# Patient Record
Sex: FEMALE | Race: OTHER | HISPANIC OR LATINO | URBAN - METROPOLITAN AREA
[De-identification: names, ages, dates, MRNs, and addresses within clinical notes are randomized per-mention and may not be internally consistent; named-entity substitution may affect disease eponyms.]

---

## 2018-01-22 ENCOUNTER — EMERGENCY (EMERGENCY)
Facility: HOSPITAL | Age: 28
LOS: 1 days | Discharge: HOME | End: 2018-01-22
Admitting: EMERGENCY MEDICINE
Payer: COMMERCIAL

## 2018-01-22 VITALS
RESPIRATION RATE: 16 BRPM | OXYGEN SATURATION: 99 % | HEART RATE: 76 BPM | WEIGHT: 130.07 LBS | SYSTOLIC BLOOD PRESSURE: 120 MMHG | TEMPERATURE: 98 F | DIASTOLIC BLOOD PRESSURE: 73 MMHG

## 2018-01-22 DIAGNOSIS — M79.671 PAIN IN RIGHT FOOT: ICD-10-CM

## 2018-01-22 DIAGNOSIS — S93.601A UNSPECIFIED SPRAIN OF RIGHT FOOT, INITIAL ENCOUNTER: ICD-10-CM

## 2018-01-22 DIAGNOSIS — R51 HEADACHE: ICD-10-CM
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
## 2018-01-22 PROCEDURE — 73630 X-RAY EXAM OF FOOT: CPT

## 2018-01-22 PROCEDURE — 73630 X-RAY EXAM OF FOOT: CPT | Mod: 26,RT

## 2018-01-22 PROCEDURE — 99283 EMERGENCY DEPT VISIT LOW MDM: CPT

## 2018-01-22 PROCEDURE — 99283 EMERGENCY DEPT VISIT LOW MDM: CPT | Mod: 25

## 2018-01-22 RX ORDER — IBUPROFEN 200 MG
1 TABLET ORAL
Qty: 30 | Refills: 0 | OUTPATIENT
Start: 2018-01-22 | End: 2018-01-31

## 2018-01-22 NOTE — ED PROVIDER NOTE - MUSCULOSKELETAL, MLM
+ tenderness to dorsum right foot. chronic deformities to 2nd through 5th toes. limited ROM due to pain. no swelling, bruising or redness. distal NVI. remainder of foot and ankle non tender.

## 2018-01-22 NOTE — ED PROVIDER NOTE - MEDICAL DECISION MAKING DETAILS
foot pain likely sprain. pt well appearing. no evidence of infection. x-ray no acute pathology. motrin, rice and fu with podiatry. intermittent ha's. neuro exam intact. no ha at this time. will refer to neuro
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
## 2019-10-07 ENCOUNTER — EMERGENCY (EMERGENCY)
Facility: HOSPITAL | Age: 29
LOS: 1 days | Discharge: HOME | End: 2019-10-07
Admitting: EMERGENCY MEDICINE
Payer: COMMERCIAL

## 2019-10-07 VITALS
SYSTOLIC BLOOD PRESSURE: 108 MMHG | OXYGEN SATURATION: 97 % | WEIGHT: 130.07 LBS | TEMPERATURE: 99 F | DIASTOLIC BLOOD PRESSURE: 74 MMHG | HEART RATE: 64 BPM | RESPIRATION RATE: 15 BRPM

## 2019-10-07 VITALS
SYSTOLIC BLOOD PRESSURE: 136 MMHG | HEART RATE: 65 BPM | OXYGEN SATURATION: 98 % | TEMPERATURE: 98 F | RESPIRATION RATE: 16 BRPM | DIASTOLIC BLOOD PRESSURE: 62 MMHG

## 2019-10-07 DIAGNOSIS — N64.4 MASTODYNIA: ICD-10-CM

## 2019-10-07 DIAGNOSIS — Z79.1 LONG TERM (CURRENT) USE OF NON-STEROIDAL ANTI-INFLAMMATORIES (NSAID): ICD-10-CM

## 2019-10-07 DIAGNOSIS — N61.1 ABSCESS OF THE BREAST AND NIPPLE: ICD-10-CM

## 2019-10-07 LAB
ALBUMIN SERPL ELPH-MCNC: 3.9 G/DL — SIGNIFICANT CHANGE UP (ref 3.3–5)
ALP SERPL-CCNC: 62 U/L — SIGNIFICANT CHANGE UP (ref 40–120)
ALT FLD-CCNC: 24 U/L — SIGNIFICANT CHANGE UP (ref 10–45)
ANION GAP SERPL CALC-SCNC: 12 MMOL/L — SIGNIFICANT CHANGE UP (ref 5–17)
AST SERPL-CCNC: 14 U/L — SIGNIFICANT CHANGE UP (ref 10–40)
BASOPHILS # BLD AUTO: 0.05 K/UL — SIGNIFICANT CHANGE UP (ref 0–0.2)
BASOPHILS NFR BLD AUTO: 0.3 % — SIGNIFICANT CHANGE UP (ref 0–2)
BILIRUB SERPL-MCNC: 0.3 MG/DL — SIGNIFICANT CHANGE UP (ref 0.2–1.2)
BUN SERPL-MCNC: 10 MG/DL — SIGNIFICANT CHANGE UP (ref 7–23)
CALCIUM SERPL-MCNC: 9.1 MG/DL — SIGNIFICANT CHANGE UP (ref 8.4–10.5)
CHLORIDE SERPL-SCNC: 102 MMOL/L — SIGNIFICANT CHANGE UP (ref 96–108)
CO2 SERPL-SCNC: 24 MMOL/L — SIGNIFICANT CHANGE UP (ref 22–31)
CREAT SERPL-MCNC: 0.67 MG/DL — SIGNIFICANT CHANGE UP (ref 0.5–1.3)
EOSINOPHIL # BLD AUTO: 0.02 K/UL — SIGNIFICANT CHANGE UP (ref 0–0.5)
EOSINOPHIL NFR BLD AUTO: 0.1 % — SIGNIFICANT CHANGE UP (ref 0–6)
GLUCOSE SERPL-MCNC: 126 MG/DL — HIGH (ref 70–99)
GRAM STN FLD: SIGNIFICANT CHANGE UP
HCT VFR BLD CALC: 42.9 % — SIGNIFICANT CHANGE UP (ref 34.5–45)
HGB BLD-MCNC: 13.7 G/DL — SIGNIFICANT CHANGE UP (ref 11.5–15.5)
IMM GRANULOCYTES NFR BLD AUTO: 0.5 % — SIGNIFICANT CHANGE UP (ref 0–1.5)
LYMPHOCYTES # BLD AUTO: 1.96 K/UL — SIGNIFICANT CHANGE UP (ref 1–3.3)
LYMPHOCYTES # BLD AUTO: 11.9 % — LOW (ref 13–44)
MCHC RBC-ENTMCNC: 28.1 PG — SIGNIFICANT CHANGE UP (ref 27–34)
MCHC RBC-ENTMCNC: 31.9 GM/DL — LOW (ref 32–36)
MCV RBC AUTO: 87.9 FL — SIGNIFICANT CHANGE UP (ref 80–100)
MONOCYTES # BLD AUTO: 1.18 K/UL — HIGH (ref 0–0.9)
MONOCYTES NFR BLD AUTO: 7.2 % — SIGNIFICANT CHANGE UP (ref 2–14)
NEUTROPHILS # BLD AUTO: 13.21 K/UL — HIGH (ref 1.8–7.4)
NEUTROPHILS NFR BLD AUTO: 80 % — HIGH (ref 43–77)
NRBC # BLD: 0 /100 WBCS — SIGNIFICANT CHANGE UP (ref 0–0)
PLATELET # BLD AUTO: 236 K/UL — SIGNIFICANT CHANGE UP (ref 150–400)
POTASSIUM SERPL-MCNC: 3.5 MMOL/L — SIGNIFICANT CHANGE UP (ref 3.5–5.3)
POTASSIUM SERPL-SCNC: 3.5 MMOL/L — SIGNIFICANT CHANGE UP (ref 3.5–5.3)
PROT SERPL-MCNC: 6.8 G/DL — SIGNIFICANT CHANGE UP (ref 6–8.3)
RBC # BLD: 4.88 M/UL — SIGNIFICANT CHANGE UP (ref 3.8–5.2)
RBC # FLD: 13.4 % — SIGNIFICANT CHANGE UP (ref 10.3–14.5)
SODIUM SERPL-SCNC: 138 MMOL/L — SIGNIFICANT CHANGE UP (ref 135–145)
SPECIMEN SOURCE: SIGNIFICANT CHANGE UP
WBC # BLD: 16.5 K/UL — HIGH (ref 3.8–10.5)
WBC # FLD AUTO: 16.5 K/UL — HIGH (ref 3.8–10.5)

## 2019-10-07 PROCEDURE — 99284 EMERGENCY DEPT VISIT MOD MDM: CPT | Mod: 25

## 2019-10-07 PROCEDURE — 36415 COLL VENOUS BLD VENIPUNCTURE: CPT

## 2019-10-07 PROCEDURE — 80053 COMPREHEN METABOLIC PANEL: CPT

## 2019-10-07 PROCEDURE — 99285 EMERGENCY DEPT VISIT HI MDM: CPT

## 2019-10-07 PROCEDURE — 76641 ULTRASOUND BREAST COMPLETE: CPT

## 2019-10-07 PROCEDURE — 87186 SC STD MICRODIL/AGAR DIL: CPT

## 2019-10-07 PROCEDURE — 76641 ULTRASOUND BREAST COMPLETE: CPT | Mod: 26,RT

## 2019-10-07 PROCEDURE — 10060 I&D ABSCESS SIMPLE/SINGLE: CPT | Mod: RT

## 2019-10-07 PROCEDURE — 85025 COMPLETE CBC W/AUTO DIFF WBC: CPT

## 2019-10-07 PROCEDURE — 96375 TX/PRO/DX INJ NEW DRUG ADDON: CPT | Mod: XU

## 2019-10-07 PROCEDURE — 87205 SMEAR GRAM STAIN: CPT

## 2019-10-07 PROCEDURE — 96374 THER/PROPH/DIAG INJ IV PUSH: CPT | Mod: XU

## 2019-10-07 PROCEDURE — 87040 BLOOD CULTURE FOR BACTERIA: CPT

## 2019-10-07 PROCEDURE — 87070 CULTURE OTHR SPECIMN AEROBIC: CPT

## 2019-10-07 RX ORDER — IBUPROFEN 200 MG
1 TABLET ORAL
Qty: 15 | Refills: 0
Start: 2019-10-07 | End: 2019-10-11

## 2019-10-07 RX ORDER — HYDROMORPHONE HYDROCHLORIDE 2 MG/ML
0.5 INJECTION INTRAMUSCULAR; INTRAVENOUS; SUBCUTANEOUS ONCE
Refills: 0 | Status: DISCONTINUED | OUTPATIENT
Start: 2019-10-07 | End: 2019-10-07

## 2019-10-07 RX ORDER — KETOROLAC TROMETHAMINE 30 MG/ML
15 SYRINGE (ML) INJECTION ONCE
Refills: 0 | Status: DISCONTINUED | OUTPATIENT
Start: 2019-10-07 | End: 2019-10-07

## 2019-10-07 RX ADMIN — Medication 100 MILLIGRAM(S): at 16:06

## 2019-10-07 RX ADMIN — HYDROMORPHONE HYDROCHLORIDE 0.5 MILLIGRAM(S): 2 INJECTION INTRAMUSCULAR; INTRAVENOUS; SUBCUTANEOUS at 22:13

## 2019-10-07 RX ADMIN — Medication 15 MILLIGRAM(S): at 16:06

## 2019-10-07 NOTE — ED PROVIDER NOTE - PATIENT PORTAL LINK FT
You can access the FollowMyHealth Patient Portal offered by [facility] by registering at the following website: [email or web address]. By joining [employer]’s FollowMyHealth portal, you will also be able to view your health information using other applications (apps) compatible with our system.
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
## 2019-10-07 NOTE — ED PROVIDER NOTE - OBJECTIVE STATEMENT
30 y/o F with no PMHx presents to the ED with complaints of right sided atraumatic breast pain x 1.5 weeks, increasing in severity over the past 3 days. Pt reports associated swelling, redness, and tenderness. Pt notes that she has been using warm compresses without relief. Denies fever, chills, or drainage.

## 2019-10-07 NOTE — ED PROVIDER NOTE - PHYSICAL EXAMINATION
VITAL SIGNS: I have reviewed nursing notes and confirm.  CONSTITUTIONAL: Well-developed; well-nourished; in no acute distress.  SKIN: Agree with RN documentation regarding decubitus evaluation. Remainder of skin exam is warm and dry, no acute rash. Moderate erythema and swelling with possible fluctuance noted to 2x2cm node on right breast, left of areola. Non-demarcated blanching erythema.   HEAD: Normocephalic; atraumatic.  EYES: PERRL, EOM intact; conjunctiva and sclera clear.  ENT: No nasal discharge; airway clear.  NECK: Supple; non tender.  CARD: S1, S2 normal; no murmurs, gallops, or rubs. Regular rate and rhythm.  RESP: No wheezes, rales or rhonchi.  ABD: Normal bowel sounds; soft; non-distended; non-tender; no hepatosplenomegaly.  EXT: Normal ROM. No clubbing, cyanosis or edema.  LYMPH: No acute cervical adenopathy.  NEURO: Alert, oriented. Grossly unremarkable.  PSYCH: Cooperative, appropriate.

## 2019-10-07 NOTE — CONSULT NOTE ADULT - SUBJECTIVE AND OBJECTIVE BOX
HPI: 29F PMH L breast abscesses 2017, no other PMH/PSH, presents with 3 days of R breast pain. The pain is periareolar, sharp, and present with pressure. She states that the pain has been constant for 1 day. She also endorses 2 days of erythema/edema. She denies any drainage, bleeding, fevers/chills, nausea/vomiting, chest pain, shortness of breath, or history of breast cancer. Her L breast abscesses in 2017 were managed with topical antibiotics, and without systemic antibiotics or drainage.        PAST MEDICAL & SURGICAL HISTORY:  No pertinent past medical history  No significant past surgical history      FAMILY HISTORY:  No family history of breast cancer    MEDICATIONS:  None    Allergies  No Known Allergies          OBJECTIVE:  Vital Signs Last 24 Hrs  T(C): 36.8 (07 Oct 2019 22:40), Max: 37.2 (07 Oct 2019 14:39)  T(F): 98.2 (07 Oct 2019 22:40), Max: 98.9 (07 Oct 2019 14:39)  HR: 65 (07 Oct 2019 22:40) (64 - 69)  BP: 136/62 (07 Oct 2019 22:40) (108/74 - 136/62)  BP(mean): --  RR: 16 (07 Oct 2019 22:40) (15 - 16)  SpO2: 98% (07 Oct 2019 22:40) (97% - 99%)    I&O's Summary      Physical Exam:  General: NAD, resting comfortably  Respiratory: No accessory muscle use  Left breast: No masses appreciated, 1cm scar at 9 o'clock and 12 o'clock region  Right breast: Erythema and induration from 4cm superior to nipple to 1cm inferior to nipple from 9 o'clock to 2 o'clock region. Fluctuance from 12 o'clock region to 2 o'clock region at areolar border.  Neuro: AAO x 3, no focal deficits      LABS:                        13.7   16.50 )-----------( 236      ( 07 Oct 2019 15:54 )             42.9     10-07    138  |  102  |  10  ----------------------------<  126<H>  3.5   |  24  |  0.67    Ca    9.1      07 Oct 2019 15:54    TPro  6.8  /  Alb  3.9  /  TBili  0.3  /  DBili  x   /  AST  14  /  ALT  24  /  AlkPhos  62  10-07        CAPILLARY BLOOD GLUCOSE        LIVER FUNCTIONS - ( 07 Oct 2019 15:54 )  Alb: 3.9 g/dL / Pro: 6.8 g/dL / ALK PHOS: 62 U/L / ALT: 24 U/L / AST: 14 U/L / GGT: x           ULTRASOUND  Findings: Targeted ultrasound of the right breast demonstrates a   multiloculated complex cystic mass in the retroareolar region with   peripheral and internal vascularity measuring up to 3.1 x 1.6 x 3.8 cm.   No tract is identified to the skin surface.. There are dilated ducts and   subcutaneous tissue edema.    IMPRESSION: 3.1 x 1.6 x 3.8 cm retroareolar multiloculated complex cystic   mass with peripheral and internal vascularity could represent a   developing abscess.

## 2019-10-07 NOTE — ED PROVIDER NOTE - CARE PROVIDER_API CALL
Alphonse Marrero (MD)  Surgery  [address], [facility], [place] [address]  Phone: [phone]  Fax: [phone]  Follow Up Time:

## 2019-10-07 NOTE — ED ADULT NURSE NOTE - OBJECTIVE STATEMENT
Patient presents to the ED complaining of right sided breast pain since mid september, however the pain has gotten worst the past three day. Redness and swelling noted to red breast. No discharge. Denies any fever or chills.

## 2019-10-07 NOTE — ED PROVIDER NOTE - CLINICAL SUMMARY MEDICAL DECISION MAKING FREE TEXT BOX
30 y/o female with R breast discomfort. US + for abscess with elevated wbc. pt afebrile and non-toxic appearing. Surgery conducted aspiration. Will fu with ACS in one week.

## 2019-10-07 NOTE — ED PROVIDER NOTE - NSFOLLOWUPINSTRUCTIONS_ED_ALL_ED_FT
Abscess Incision and Drainage    WHAT YOU NEED TO KNOW:    What do I need to know about an abscess incision and drainage? An abscess incision and drainage (I and D) is a procedure to drain pus from an abscess and clean it out so it can heal.    How do I prepare for an I and D? Your healthcare provider will talk to you about how to prepare for an I and D. He will tell you what medicines to take or not take on the day of your I and D.    What will happen during an I and D? You will be given local or regional anesthesia to numb the area and keep you free from pain. Your healthcare provider will make an incision in your skin near or over the abscess. He will press on the area to drain the pus. A cotton swab or other medical tool wrapped in gauze may be used to clean the inside of the abscess. Your healthcare provider may wash the wound with saline (salt water). He may place plain gauze or gauze with medicine in your wound to help it heal. A dry bandage will be placed over your wound. You may be given antibiotics to treat the infection.    What are the risks of an I and D? A scar may form on your skin as it heals. Your incision may heal slowly, feel painful, or get infected. Your abscess may come back, even after treatment. You may need another I and D if the abscess comes back. The bacteria may spread to your heart or other organs. This can be life-threatening.    CARE AGREEMENT:    You have the right to help plan your care. Learn about your health condition and how it may be treated. Discuss treatment options with your healthcare providers to decide what care you want to receive. You always have the right to refuse treatment.        © Copyright [employer] 2019 All illustrations and images included in CareNotes are the copyrighted property of [employer] or [employer].      back to top                      © Copyright [employer] 2019

## 2019-10-10 LAB
-  CEFAZOLIN: SIGNIFICANT CHANGE UP
-  CLINDAMYCIN: SIGNIFICANT CHANGE UP
-  ERYTHROMYCIN: SIGNIFICANT CHANGE UP
-  LINEZOLID: SIGNIFICANT CHANGE UP
-  OXACILLIN: SIGNIFICANT CHANGE UP
-  RIFAMPIN: SIGNIFICANT CHANGE UP
-  TRIMETHOPRIM/SULFAMETHOXAZOLE: SIGNIFICANT CHANGE UP
-  VANCOMYCIN: SIGNIFICANT CHANGE UP
CULTURE RESULTS: SIGNIFICANT CHANGE UP
METHOD TYPE: SIGNIFICANT CHANGE UP
ORGANISM # SPEC MICROSCOPIC CNT: SIGNIFICANT CHANGE UP
ORGANISM # SPEC MICROSCOPIC CNT: SIGNIFICANT CHANGE UP
SPECIMEN SOURCE: SIGNIFICANT CHANGE UP

## 2019-10-11 RX ORDER — AZTREONAM 2 G
1 VIAL (EA) INJECTION
Qty: 20 | Refills: 0
Start: 2019-10-11 | End: 2019-10-20

## 2019-10-12 LAB
CULTURE RESULTS: SIGNIFICANT CHANGE UP
CULTURE RESULTS: SIGNIFICANT CHANGE UP
SPECIMEN SOURCE: SIGNIFICANT CHANGE UP
SPECIMEN SOURCE: SIGNIFICANT CHANGE UP

## 2019-10-14 ENCOUNTER — APPOINTMENT (OUTPATIENT)
Dept: BREAST CENTER | Facility: CLINIC | Age: 29
End: 2019-10-14

## 2019-10-14 PROBLEM — Z00.00 ENCOUNTER FOR PREVENTIVE HEALTH EXAMINATION: Status: ACTIVE | Noted: 2019-10-14

## 2020-08-31 ENCOUNTER — EMERGENCY (EMERGENCY)
Facility: HOSPITAL | Age: 30
LOS: 1 days | Discharge: HOME | End: 2020-08-31
Attending: EMERGENCY MEDICINE | Admitting: EMERGENCY MEDICINE
Payer: SELF-PAY

## 2020-08-31 VITALS
OXYGEN SATURATION: 100 % | HEART RATE: 73 BPM | HEIGHT: 63 IN | SYSTOLIC BLOOD PRESSURE: 114 MMHG | RESPIRATION RATE: 18 BRPM | TEMPERATURE: 99 F | WEIGHT: 130.07 LBS | DIASTOLIC BLOOD PRESSURE: 69 MMHG

## 2020-08-31 PROCEDURE — 99284 EMERGENCY DEPT VISIT MOD MDM: CPT

## 2020-08-31 PROCEDURE — 99283 EMERGENCY DEPT VISIT LOW MDM: CPT

## 2020-08-31 RX ORDER — IBUPROFEN 200 MG
600 TABLET ORAL ONCE
Refills: 0 | Status: COMPLETED | OUTPATIENT
Start: 2020-08-31 | End: 2020-08-31

## 2020-08-31 RX ADMIN — Medication 600 MILLIGRAM(S): at 16:06
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
## 2020-08-31 NOTE — ED PROVIDER NOTE - PATIENT PORTAL LINK FT
You can access the FollowMyHealth Patient Portal offered by [facility] by registering at the following website: [email or web address]. By joining [employer]’s FollowMyHealth portal, you will also be able to view your health information using other applications (apps) compatible with our system.

## 2020-08-31 NOTE — ED PROVIDER NOTE - NSFOLLOWUPCLINICS_GEN_ALL_ED_FT
[facility]  Family Medicine  [address] [place] [address]  Phone: [phone]  Fax:   Follow Up Time: 4-6 Days    [facility]  Urology  [address] [place] [address]  Phone: [phone]  Fax:   Follow Up Time: 4-6 Days

## 2020-08-31 NOTE — ED PROVIDER NOTE - NSFOLLOWUPINSTRUCTIONS_ED_ALL_ED_FT
Please follow up with your primary care doctor as needed. Return to the ED if you develop any concerning symptoms. Take over-the-counter Tylenol or Ibuprofen as prescribed for pain.     Musculoskeletal Pain  Musculoskeletal pain refers to aches and pains in your bones, joints, muscles, and the tissues that surround them. This pain can occur in any part of the body. It can last for a short time (acute) or a long time (chronic).  A physical exam, lab tests, and imaging studies may be done to find the cause of your musculoskeletal pain.  Follow these instructions at home:     Lifestyle     Try to control or lower your stress levels. Stress increases muscle tension and can worsen musculoskeletal pain. It is important to recognize when you are anxious or stressed and learn ways to manage it. This may include:  Meditation or yoga.Cognitive or behavioral therapy.Acupuncture or massage therapy.You may continue all activities unless the activities cause more pain. When the pain gets better, slowly resume your normal activities. Gradually increase the intensity and duration of your activities or exercise.Managing pain, stiffness, and swelling     Take over-the-counter and prescription medicines only as told by your health care provider.When your pain is severe, bed rest may be helpful. Lie or sit in any position that is comfortable, but get out of bed and walk around at least every couple of hours.If directed, apply heat to the affected area as often as told by your health care provider. Use the heat source that your health care provider recommends, such as a moist heat pack or a heating pad.  Place a towel between your skin and the heat source.Leave the heat on for 20–30 minutes.Remove the heat if your skin turns bright red. This is especially important if you are unable to feel pain, heat, or cold. You may have a greater risk of getting burned.If directed, put ice on the painful area.  Put ice in a plastic bag.Place a towel between your skin and the bag.Leave the ice on for 20 minutes, 2–3 times a day.General instructions     Your health care provider may recommend that you see a physical therapist. This person can help you come up with a safe exercise program. Do any exercises as told by your physical therapist.Keep all follow-up visits, including any physical therapy visits, as told by your health care providers. This is important.Contact a health care provider if:  Your pain gets worse.Medicines do not help ease your pain.You cannot use the part of your body that hurts, such as your arm, leg, or neck.You have trouble sleeping.You have trouble doing your normal activities.Get help right away if:  You have a new injury and your pain is worse or different.You feel numb or you have tingling in the painful area.Summary  Musculoskeletal pain refers to aches and pains in your bones, joints, muscles, and the tissues that surround them.This pain can occur in any part of the body.Your health care provider may recommend that you see a physical therapist. This person can help you come up with a safe exercise program. Do any exercises as told by your physical therapist.Lower your stress level. Stress can worsen musculoskeletal pain. Ways to lower stress may include meditation, yoga, cognitive or behavioral therapy, acupuncture, and massage therapy.This information is not intended to replace advice given to you by your health care provider. Make sure you discuss any questions you have with your health care provider.    Document Released: 12/18/2006 Document Revised: 11/30/2018 Document Reviewed: 01/17/2018  Elsevier Patient Education © 2020 [employer] Inc[employer]
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
## 2020-08-31 NOTE — ED PROVIDER NOTE - PHYSICAL EXAMINATION
VITAL SIGNS: I have reviewed nursing notes and confirm.  CONSTITUTIONAL: Well-developed; well-nourished; in no acute distress.   SKIN:  warm and dry, no acute rash.   HEAD:  normocephalic, atraumatic.  EYES: EOM intact; conjunctiva and sclera clear.  ENT: No nasal discharge; airway clear.   NECK: No midline spinal tenderness. Mild tenderness to both trapezius muscles, b/l paraspinal muscles in lumbar area.  CARD: S1, S2 normal; no murmurs, gallops, or rubs. Regular rate and rhythm.   RESP:  Clear to auscultation b/l, no wheezes, rales or rhonchi.  ABD: Normal bowel sounds; soft; non-distended; non-tender; no guarding/ rebound.  EXT: Normal ROM. No clubbing, cyanosis or edema. 2+ pulses to b/l ue/le.  NEURO: AAO x 3, CN II-XII intact, normal speech, strength 5/5 bilateral upper and lower extremities, sensation intact, cerebellum intact, no ataxia, follows commands appropriately  PSYCH: Cooperative, mood and affect appropriate. VITAL SIGNS: I have reviewed nursing notes and confirm.  CONSTITUTIONAL: Well-developed; well-nourished; in no acute distress.   SKIN:  warm and dry, no acute rash.   HEAD:  normocephalic, atraumatic.  EYES: EOM intact; conjunctiva and sclera clear.  ENT: No nasal discharge; airway clear.   NECK: No midline spinal tenderness. Mild tenderness to both trapezius muscles, b/l paraspinal muscles in lumbar area.  CARD: S1, S2 normal; no murmurs, gallops, or rubs. Regular rate and rhythm.   RESP:  Clear to auscultation b/l, no wheezes, rales or rhonchi.  ABD: Normal bowel sounds; soft; non-distended; non-tender; no guarding/ rebound.  EXT: Normal ROM.    NEURO: AAO x 3, CN II-XII intact, normal speech, strength 5/5 bilateral upper and lower extremities, sensation intact, cerebellum intact, no ataxia, follows commands appropriately  PSYCH: Cooperative, mood and affect appropriate.

## 2020-09-04 DIAGNOSIS — M54.5 LOW BACK PAIN: ICD-10-CM

## 2020-09-04 DIAGNOSIS — M54.9 DORSALGIA, UNSPECIFIED: ICD-10-CM
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
## 2020-09-04 DIAGNOSIS — M54.2 CERVICALGIA: ICD-10-CM
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
## 2021-06-29 NOTE — ED ADULT NURSE NOTE - NSFALLRSKOUTCOME_ED_ALL_ED
[facility]  Progress Note Ramy Arriaga 5/24/1926, 81 y o  male MRN: [id number]  Unit/Bed#: S -01 Encounter: [id number]  Primary Care Provider: Phyllis Macedo MD   Date and time admitted to hospital: 6/28/2021 11:39 AM    Abrasions of multiple sites  Assessment & Plan  Stable and healing well  - no trauma follow up needed  - pain medication PRN      VINICIUS (acute kidney injury) ([facility] [address] )  Assessment & Plan  Improving  - Cr 1 59 today but still above baseline cr  - contininue IVF and hold BP meds as above  - SLIM following    Thrombocytopenia (HCC)  Assessment & Plan  Chronic condition in setting of multiple myeloma  - slight decrease today to 39  - SLIM following    Acquired hypothyroidism  Assessment & Plan  Stable  - continue home medication    Essential hypertension  Assessment & Plan  Stable  - HCTZ and triamterene on hold secondary to VINICIUS  - SLIM following      No additional injuries found on tertiary exam requiring trauma care  Please call if needed  No outpt follow up needed with trauma    TERTIARY TRAUMA SURVEY NOTE    Prophylaxis: Sequential compression device (Venodyne)     Disposition: per primary team    Code status:  Level 1 - Full Code    Consultants: SL trauma     Is the patient 72 years or older?: YES:    1  Before the illness or injury that brought you to the Emergency, did you need someone to help you on a regular basis? 1=Yes   2  Since the illness or injury that brought you to the Emergency, have you needed more help than usual to take care of yourself? 1=Yes   3  Have you been hospitalized for one or more nights during the past 6 months (excluding a stay in the Emergency Department)? 0=No   4  In general, do you see well? 0=Yes   5  In general, do you have serious problems with your memory? 0=No   6  Do you take more than three different medications everyday?  1=Yes   TOTAL   3     Did you order a geriatric consult if the score was 2 or greater?: no, but will recommend to primary team          SUBJECTIVE:     Transfer from: N/A  Outside Films Received: not applicable  Tertiary Exam Due on: 6/29/21    Mechanism of Injury:Fall    Details related to Injury: +LOC:  no    Chief Complaint: none    HPI/Last 24 hour events: no events overnight  Voiding and tolerating diet    Active medications:           Current Facility-Administered Medications:     acetaminophen (TYLENOL) tablet 650 mg, 650 mg, Oral, Q6H PRN    allopurinol (ZYLOPRIM) tablet 200 mg, 200 mg, Oral, Daily    bacitracin topical ointment 1 small application, 1 small application, Topical, BID, 1 small application at 83/35/26 1840    docusate sodium (COLACE) capsule 100 mg, 100 mg, Oral, BID, 100 mg at 06/28/21 2014    levothyroxine tablet 100 mcg, 100 mcg, Oral, Early Morning, 100 mcg at 06/29/21 0511    ondansetron (ZOFRAN-ODT) dispersible tablet 4 mg, 4 mg, Oral, Q6H PRN    pantoprazole (PROTONIX) EC tablet 20 mg, 20 mg, Oral, Daily    polyethylene glycol (MIRALAX) packet 17 g, 17 g, Oral, Daily PRN    senna (SENOKOT) tablet 8 6 mg, 1 tablet, Oral, HS    sodium chloride 0 9 % infusion, 100 mL/hr, Intravenous, Continuous, 100 mL/hr at 06/28/21 2012      OBJECTIVE:     Vitals:   Vitals:    06/29/21 0715   BP: 111/61   Pulse: 88   Resp: 12   Temp: 97 7 °F (36 5 °C)   SpO2: 96%       Physical Exam:   GENERAL APPEARANCE: NAD  NEURO: GCS 15  HEENT: normocephalic, R preauricular and corner of right mouth contusion  CV: RRR  LUNGS: CTAB  GI: soft and nontender  : Voiding well  MSK: WHITFIELD and NVI x4  SKIN: warm and dry    I/O:   I/O       06/27 0701 - 06/28 0700 06/28 0701 - 06/29 0700 06/29 0701 - 06/30 0700    IV Piggyback  1000     Total Intake  1000     Net  +1000                  Invasive Devices:    Invasive Devices     Peripheral Intravenous Line            Peripheral IV 06/28/21 Left Antecubital <1 day                  Imaging:   XR chest 2 views    Result Date: 6/28/2021  Impression: No acute cardiopulmonary disease  Findings are stable Workstation performed: [id number]     CT head without contrast    Result Date: 6/28/2021  Impression: No acute intracranial abnormality   Workstation performed: [id number]       Labs:   CBC:   Lab Results   Component Value Date    WBC 6 46 06/29/2021    HGB 10 3 (L) 06/29/2021    HCT 30 6 (L) 06/29/2021     (H) 06/29/2021    PLT 36 (LL) 06/29/2021    MCH 36 0 (H) 06/29/2021    MCHC 33 7 06/29/2021    RDW 15 8 (H) 06/29/2021    MPV 14 4 (H) 06/29/2021    NRBC 0 06/28/2021     CMP:   Lab Results   Component Value Date     06/29/2021    CO2 21 06/29/2021    BUN 76 (H) 06/29/2021    CREATININE 1 59 (H) 06/29/2021    CALCIUM 7 7 (L) 06/29/2021    AST 34 06/29/2021    ALT 24 06/29/2021    ALKPHOS 130 (H) 06/29/2021    EGFR 37 06/29/2021 Universal Safety Interventions

## 2021-12-29 ENCOUNTER — EMERGENCY (EMERGENCY)
Facility: HOSPITAL | Age: 31
LOS: 1 days | Discharge: HOME | End: 2021-12-29
Admitting: EMERGENCY MEDICINE
Payer: COMMERCIAL

## 2021-12-29 VITALS
WEIGHT: 143.08 LBS | TEMPERATURE: 98 F | DIASTOLIC BLOOD PRESSURE: 75 MMHG | SYSTOLIC BLOOD PRESSURE: 124 MMHG | HEIGHT: 63 IN | RESPIRATION RATE: 16 BRPM | HEART RATE: 96 BPM | OXYGEN SATURATION: 96 %

## 2021-12-29 DIAGNOSIS — J45.909 UNSPECIFIED ASTHMA, UNCOMPLICATED: ICD-10-CM

## 2021-12-29 DIAGNOSIS — R05.1 ACUTE COUGH: ICD-10-CM

## 2021-12-29 DIAGNOSIS — U07.1 COVID-19: ICD-10-CM

## 2021-12-29 LAB
HIV 1+2 AB+HIV1 P24 AG SERPL QL IA: SIGNIFICANT CHANGE UP
RAPID RVP RESULT: DETECTED
SARS-COV-2 RNA SPEC QL NAA+PROBE: DETECTED

## 2021-12-29 PROCEDURE — 0225U NFCT DS DNA&RNA 21 SARSCOV2: CPT

## 2021-12-29 PROCEDURE — 87389 HIV-1 AG W/HIV-1&-2 AB AG IA: CPT

## 2021-12-29 PROCEDURE — 99283 EMERGENCY DEPT VISIT LOW MDM: CPT

## 2021-12-29 PROCEDURE — 99284 EMERGENCY DEPT VISIT MOD MDM: CPT

## 2021-12-29 PROCEDURE — 36415 COLL VENOUS BLD VENIPUNCTURE: CPT

## 2021-12-29 NOTE — ED PROVIDER NOTE - CLINICAL SUMMARY MEDICAL DECISION MAKING FREE TEXT BOX
32 y/o F with a cc of body aches and fatigue, suspect viral infection, wants to check for Covid, otherwise healthy, recommend f/u with pmd, seek medical attn if worsen symptoms immediately, take Tylenol / Motrin for fever and aches if needed, any worsening symptoms come back to ED.

## 2021-12-29 NOTE — ED PROVIDER NOTE - PATIENT PORTAL LINK FT
You can access the FollowMyHealth Patient Portal offered by [facility] by registering at the following website: [email or web address]. By joining [employer]’s FollowMyHealth portal, you will also be able to view your health information using other applications (apps) compatible with our system.

## 2021-12-29 NOTE — ED PROVIDER NOTE - ENMT, MLM
Airway patent, Nasal mucosa clear. Mouth with normal mucosa. Throat has no vesicles, no oropharyngeal exudates and uvula is midline. Mild erythema to pharynx, no tonsil enlargements.

## 2021-12-29 NOTE — ED PROVIDER NOTE - OBJECTIVE STATEMENT
30 y/o F with a PMHX of asthma presents to the ED with a CC of dry cough, fatigue, sweats, chills and myalgia for x2 days. Reports x1 bilious non bloody vomiting. Denies any diarrhea, abdominal pain, ha, congestion, sore throat, vision changes and any gate imbalances. 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 Pt is fully vaccinated for Covid and is requesting HIV testing.

## 2022-01-14 NOTE — ED ADULT NURSE NOTE - CAS EDN DISCHARGE ASSESSMENT
Initial Anesthesia Post-op Note    Patient: Kinjal Abdalla  Procedure(s) Performed: ESOPHAGOGASTRODUODENOSCOPYCOLONOSCOPY  Anesthesia type: MAC    Vitals Value Taken Time   Temp 97  3 01/14/22 0853   Pulse 95 01/14/22 0852   Resp 30 01/14/22 0852   SpO2 96 % 01/14/22 0852   /72 01/14/22 0853   Vitals shown include unvalidated device data.      Patient Location: PACU Phase 1  Post-op Vital Signs:stable  Level of Consciousness: awake and alert  Respiratory Status: spontaneous ventilation  Cardiovascular stable  Hydration: euvolemic  Pain Management: adequately controlled  Handoff: Handoff to receiving nurse was performed and questions were answered  Vomiting: none  Nausea: None  Airway Patency:patent  Post-op Assessment: no complications and patient tolerated procedure well with no complications      No complications documented.  
Alert and oriented to person, place and time

## 2022-10-26 ENCOUNTER — EMERGENCY (EMERGENCY)
Facility: HOSPITAL | Age: 32
LOS: 1 days | Discharge: HOME | End: 2022-10-26
Attending: STUDENT IN AN ORGANIZED HEALTH CARE EDUCATION/TRAINING PROGRAM | Admitting: STUDENT IN AN ORGANIZED HEALTH CARE EDUCATION/TRAINING PROGRAM
Payer: COMMERCIAL

## 2022-10-26 VITALS
WEIGHT: 130.07 LBS | SYSTOLIC BLOOD PRESSURE: 124 MMHG | HEIGHT: 63 IN | DIASTOLIC BLOOD PRESSURE: 84 MMHG | HEART RATE: 77 BPM | RESPIRATION RATE: 18 BRPM | TEMPERATURE: 99 F | OXYGEN SATURATION: 98 %

## 2022-10-26 DIAGNOSIS — R68.83 CHILLS (WITHOUT FEVER): ICD-10-CM

## 2022-10-26 DIAGNOSIS — R05.1 ACUTE COUGH: ICD-10-CM

## 2022-10-26 DIAGNOSIS — R09.81 NASAL CONGESTION: ICD-10-CM

## 2022-10-26 DIAGNOSIS — U07.1 COVID-19: ICD-10-CM

## 2022-10-26 DIAGNOSIS — M79.10 MYALGIA, UNSPECIFIED SITE: ICD-10-CM

## 2022-10-26 DIAGNOSIS — J45.909 UNSPECIFIED ASTHMA, UNCOMPLICATED: ICD-10-CM

## 2022-10-26 DIAGNOSIS — R35.0 FREQUENCY OF MICTURITION: ICD-10-CM

## 2022-10-26 LAB
APPEARANCE UR: CLEAR — SIGNIFICANT CHANGE UP
BACTERIA # UR AUTO: PRESENT /HPF
BILIRUB UR-MCNC: NEGATIVE — SIGNIFICANT CHANGE UP
COLOR SPEC: YELLOW — SIGNIFICANT CHANGE UP
DIFF PNL FLD: ABNORMAL
EPI CELLS # UR: SIGNIFICANT CHANGE UP /HPF (ref 0–5)
FLUAV AG NPH QL: SIGNIFICANT CHANGE UP
FLUBV AG NPH QL: SIGNIFICANT CHANGE UP
GLUCOSE UR QL: NEGATIVE — SIGNIFICANT CHANGE UP
HYALINE CASTS # UR AUTO: SIGNIFICANT CHANGE UP /LPF (ref 0–2)
KETONES UR-MCNC: NEGATIVE — SIGNIFICANT CHANGE UP
LEUKOCYTE ESTERASE UR-ACNC: NEGATIVE — SIGNIFICANT CHANGE UP
NITRITE UR-MCNC: NEGATIVE — SIGNIFICANT CHANGE UP
PH UR: 6 — SIGNIFICANT CHANGE UP (ref 5–8)
PROT UR-MCNC: NEGATIVE MG/DL — SIGNIFICANT CHANGE UP
RBC CASTS # UR COMP ASSIST: < 5 /HPF — SIGNIFICANT CHANGE UP
RSV RNA NPH QL NAA+NON-PROBE: SIGNIFICANT CHANGE UP
SARS-COV-2 RNA SPEC QL NAA+PROBE: DETECTED
SP GR SPEC: 1.02 — SIGNIFICANT CHANGE UP (ref 1–1.03)
UROBILINOGEN FLD QL: 0.2 E.U./DL — SIGNIFICANT CHANGE UP
WBC UR QL: < 5 /HPF — SIGNIFICANT CHANGE UP

## 2022-10-26 PROCEDURE — 99284 EMERGENCY DEPT VISIT MOD MDM: CPT

## 2022-10-26 PROCEDURE — 71046 X-RAY EXAM CHEST 2 VIEWS: CPT | Mod: 26

## 2022-10-26 PROCEDURE — 99285 EMERGENCY DEPT VISIT HI MDM: CPT | Mod: 25

## 2022-10-26 PROCEDURE — 94640 AIRWAY INHALATION TREATMENT: CPT

## 2022-10-26 PROCEDURE — 87637 SARSCOV2&INF A&B&RSV AMP PRB: CPT

## 2022-10-26 PROCEDURE — 71046 X-RAY EXAM CHEST 2 VIEWS: CPT

## 2022-10-26 PROCEDURE — 81001 URINALYSIS AUTO W/SCOPE: CPT

## 2022-10-26 RX ORDER — IPRATROPIUM/ALBUTEROL SULFATE 18-103MCG
3 AEROSOL WITH ADAPTER (GRAM) INHALATION ONCE
Refills: 0 | Status: COMPLETED | OUTPATIENT
Start: 2022-10-26 | End: 2022-10-26

## 2022-10-26 RX ORDER — ALBUTEROL 90 UG/1
2 AEROSOL, METERED ORAL
Qty: 1 | Refills: 1
Start: 2022-10-26

## 2022-10-26 RX ORDER — IBUPROFEN 200 MG
600 TABLET ORAL ONCE
Refills: 0 | Status: COMPLETED | OUTPATIENT
Start: 2022-10-26 | End: 2022-10-26

## 2022-10-26 RX ADMIN — Medication 3 MILLILITER(S): at 17:14

## 2022-10-26 RX ADMIN — Medication 600 MILLIGRAM(S): at 16:34

## 2022-10-26 RX ADMIN — Medication 3 MILLILITER(S): at 16:34

## 2022-10-26 NOTE — ED PROVIDER NOTE - PATIENT PORTAL LINK FT
You can access the FollowMyHealth Patient Portal offered by [facility] by registering at the following website: [email or web address]. By joining [facility]’s FollowMyHealth portal, you will also be able to view your health information using other applications (apps) compatible with our system.

## 2022-10-26 NOTE — ED PROVIDER NOTE - OBJECTIVE STATEMENT
31 yo F w PMH asthma p/w cough, chills, body aches, and nasal congestion for 3 days. Pt states cough is similar to prior asthma exacerbations, however she does not have her inhaler during this exacerbation. She took tylenol this morning with some improvements in symptoms. No measured fever. + mild urinary frequency increase.

## 2022-10-26 NOTE — ED PROVIDER NOTE - NS ED ROS FT
CONSTITUTIONAL: No fever, +chills, +fatigue  EYES: No eye redness, no visual changes  ENT: No ear pain, no sore throat  CARDIOVASCULAR: No chest pain, no palpitations  RESPIRATORY: +cough, no SOB  GI: No abdominal pain, no nausea, no vomiting, no constipation, no diarrhea  GENITOURINARY: No dysuria, no frequency, no hematuria  MUSCULOSKELETAL: No back pain, no joint pain, no myalgias  SKIN: No rash, no peripheral edema  NEURO: No headache, no confusion    ALL OTHER SYSTEMS NEGATIVE.

## 2022-10-26 NOTE — ED PROVIDER NOTE - CLINICAL SUMMARY MEDICAL DECISION MAKING FREE TEXT BOX
Presentation most likely asthma exacerbation in known asthmatic. Possible coinciding URI, will send viral swab.  PERC negative  Plan for duonebs x2  Will reassess and monitor airway/breathing closely  Admission if necessary

## 2022-10-26 NOTE — ED ADULT TRIAGE NOTE - SPO2 (%)
98 Nasal Turnover Hinge Flap Text: The defect edges were debeveled with a #15 scalpel blade.  Given the size, depth, location of the defect and the defect being full thickness a nasal turnover hinge flap was deemed most appropriate.  Using a sterile surgical marker, an appropriate hinge flap was drawn incorporating the defect. The area thus outlined was incised with a #15 scalpel blade. The flap was designed to recreate the nasal mucosal lining and the alar rim. The skin margins were undermined to an appropriate distance in all directions utilizing iris scissors.

## 2022-10-26 NOTE — ED PROVIDER NOTE - NSFOLLOWUPINSTRUCTIONS_ED_ALL_ED_FT
Follow up with your primary medical doctor as soon as possible.    Return to the emergency department if your symptoms worsen or if you develop new symptoms.    Asthma    Asthma is a condition in which the airways tighten and narrow, making it difficult to breath. Asthma episodes, also called asthma attacks, range from minor to life-threatening. Symptoms include wheezing, coughing, chest tightness, or shortness of breath. The diagnosis of asthma is made by a review of your medical history and a physical exam, but may involve additional testing. Asthma cannot be cured, but medicines and lifestyle changes can help control it. Avoid triggers of asthma which may include animal dander, pollen, mold, smoke, air pollutants, etc.     SEEK IMMEDIATE MEDICAL CARE IF YOU HAVE ANY OF THE FOLLOWING SYMPTOMS: worsening of symptoms, shortness of breath at rest, chest pain, bluish discoloration to lips or fingertips, lightheadedness/dizziness, or fever.

## 2022-10-26 NOTE — ED PROVIDER NOTE - PHYSICAL EXAMINATION
CONSTITUTIONAL: Non-toxic; in no apparent distress  HEAD: Normocephalic; atraumatic  EYES: PERRL; EOM intact   ENMT: External appears normal  NECK: Supple; non-tender  CARD: Normal S1, S2; no murmurs, rubs, or gallops  RESP: Normal chest excursion with respiration; + mild wheeze bl, no crackles  ABD: Soft, non-distended; non-tender  EXT: Normal ROM in all four extremities; non-tender to palpation  SKIN: Warm, dry, no rash  NEURO:  No focal neurological deficiencies.

## 2022-10-26 NOTE — ED PROVIDER NOTE - PROGRESS NOTE DETAILS
Pt CXR neg, Pt feels improved after treatment.  Will dc w albuterol pump and return precautions.  Pt is ready for discharge and safe discharge has been established. Pt was treated for asthma exacerbation and showed improvement. Will d/c with outpatient follow-up. Strict return-precautions were given and understanding was verbalized by patient.  I have discussed the discharge plan with the patient. The patient agrees with the plan, as discussed.  The patient understands Emergency Department diagnosis is a preliminary diagnosis often based on limited information and that the patient must adhere to the follow-up plan as discussed.  The patient understands that if the symptoms worsen or if prescribed medications do not have the desired/planned effect that the patient may return to the Emergency Department at any time for further evaluation and treatment.

## 2022-10-27 NOTE — ED POST DISCHARGE NOTE - REASON FOR FOLLOW-UP
Pt called to inquire about COVID test results. Positive test results and quarantine discussed. Pt also advised on f/u with pcp for re-evaluation. Other

## 2023-06-04 ENCOUNTER — EMERGENCY (EMERGENCY)
Facility: HOSPITAL | Age: 33
LOS: 1 days | Discharge: HOME | End: 2023-06-04
Attending: EMERGENCY MEDICINE | Admitting: EMERGENCY MEDICINE
Payer: COMMERCIAL

## 2023-06-04 VITALS
HEIGHT: 63 IN | RESPIRATION RATE: 16 BRPM | SYSTOLIC BLOOD PRESSURE: 118 MMHG | WEIGHT: 138.89 LBS | HEART RATE: 61 BPM | TEMPERATURE: 98 F | DIASTOLIC BLOOD PRESSURE: 77 MMHG | OXYGEN SATURATION: 98 %

## 2023-06-04 DIAGNOSIS — M79.671 PAIN IN RIGHT FOOT: ICD-10-CM

## 2023-06-04 DIAGNOSIS — G44.309 POST-TRAUMATIC HEADACHE, UNSPECIFIED, NOT INTRACTABLE: ICD-10-CM
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
## 2023-06-04 DIAGNOSIS — S30.0XXA CONTUSION OF LOWER BACK AND PELVIS, INITIAL ENCOUNTER: ICD-10-CM
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
## 2023-06-04 DIAGNOSIS — M25.572 PAIN IN LEFT ANKLE AND JOINTS OF LEFT FOOT: ICD-10-CM

## 2023-06-04 DIAGNOSIS — M25.552 PAIN IN LEFT HIP: ICD-10-CM

## 2023-06-04 DIAGNOSIS — S70.12XA CONTUSION OF LEFT THIGH, INITIAL ENCOUNTER: ICD-10-CM

## 2023-06-04 DIAGNOSIS — F07.81 POSTCONCUSSIONAL SYNDROME: ICD-10-CM

## 2023-06-04 DIAGNOSIS — S70.02XA CONTUSION OF LEFT HIP, INITIAL ENCOUNTER: ICD-10-CM

## 2023-06-04 PROCEDURE — 72170 X-RAY EXAM OF PELVIS: CPT | Mod: 26

## 2023-06-04 PROCEDURE — 96372 THER/PROPH/DIAG INJ SC/IM: CPT

## 2023-06-04 PROCEDURE — 71045 X-RAY EXAM CHEST 1 VIEW: CPT | Mod: 26

## 2023-06-04 PROCEDURE — 99284 EMERGENCY DEPT VISIT MOD MDM: CPT

## 2023-06-04 PROCEDURE — 73630 X-RAY EXAM OF FOOT: CPT | Mod: 26,RT

## 2023-06-04 PROCEDURE — 71045 X-RAY EXAM CHEST 1 VIEW: CPT

## 2023-06-04 PROCEDURE — 73610 X-RAY EXAM OF ANKLE: CPT

## 2023-06-04 PROCEDURE — 72170 X-RAY EXAM OF PELVIS: CPT

## 2023-06-04 PROCEDURE — 73610 X-RAY EXAM OF ANKLE: CPT | Mod: 26,LT

## 2023-06-04 PROCEDURE — 99284 EMERGENCY DEPT VISIT MOD MDM: CPT | Mod: 25

## 2023-06-04 PROCEDURE — 73630 X-RAY EXAM OF FOOT: CPT

## 2023-06-04 RX ORDER — CYCLOBENZAPRINE HYDROCHLORIDE 10 MG/1
10 TABLET, FILM COATED ORAL ONCE
Refills: 0 | Status: COMPLETED | OUTPATIENT
Start: 2023-06-04 | End: 2023-06-04

## 2023-06-04 RX ORDER — KETOROLAC TROMETHAMINE 30 MG/ML
30 SYRINGE (ML) INJECTION ONCE
Refills: 0 | Status: DISCONTINUED | OUTPATIENT
Start: 2023-06-04 | End: 2023-06-04

## 2023-06-04 RX ORDER — CYCLOBENZAPRINE HYDROCHLORIDE 10 MG/1
1 TABLET, FILM COATED ORAL
Qty: 30 | Refills: 0
Start: 2023-06-04 | End: 2023-06-13

## 2023-06-04 RX ADMIN — Medication 30 MILLIGRAM(S): at 23:38

## 2023-06-04 RX ADMIN — CYCLOBENZAPRINE HYDROCHLORIDE 10 MILLIGRAM(S): 10 TABLET, FILM COATED ORAL at 23:37

## 2023-06-04 NOTE — ED PROVIDER NOTE - NSFOLLOWUPINSTRUCTIONS_ED_ALL_ED_FT
as discussed please return for worsening headache , vomiting, worsening dizzyness , confusion or other concerning symptoms,    follow up with orthopedics regarding your foot / ankle injuries if continue to hurt after 1-2 weeks.     RICE  rest, ice, compress ( ace), elevate

## 2023-06-04 NOTE — ED ADULT NURSE NOTE - OBJECTIVE STATEMENT
Pt is 33F c/o L sided body pain s/p fall from horse. No LOC. PERRL. Pt is 33F c/o L sided body pain s/p fall from horse. unknown LOC. PERRL. Pt is 33F c/o L sided body pain s/p fall from horse x1wk ago on vacation and landed onto mud. unknown LOC. PERRL.

## 2023-06-04 NOTE — ED PROVIDER NOTE - ENMT, MLM
Airway patent, Nasal mucosa clear. Mouth with normal mucosa. Throat has no vesicles, no oropharyngeal exudates and uvula is midline. small lump to L side of scalp w no step offs and tolerates palpation.

## 2023-06-04 NOTE — ED PROVIDER NOTE - CPE EDP CARDIAC NORM
Subjective


Remarks


The patient had questions about the upcoming surgery.  She wanted to know how 

to take care of the catheter.  She said she does not sleep well at night.  She 

did eat her lunch.  She says her breathing was okay.  Discussed with nursing.





Objective


Vitals





Vital Signs








  Date Time  Temp Pulse Resp B/P (MAP) Pulse Ox O2 Delivery O2 Flow Rate FiO2


 


6/6/18 12:00 97.6 78 16 155/65 (95) 95   


 


6/6/18 08:57     98 Nasal Cannula 2.00 


 


6/6/18 08:00 98.4 58 18 115/58 (77) 96   


 


6/6/18 04:42 97.5 63 18 124/68 (86) 98   


 


6/6/18 03:06     97 Nasal Cannula 2.00 


 


6/6/18 02:00      Room Air 2.00 


 


6/6/18 01:35 98.0 68 18 156/66 (96) 94   


 


6/5/18 22:58 97.6 75 18 146/63 (90) 99   


 


6/5/18 19:15  68 17 133/63 (86) 95 Room Air  


 


6/5/18 18:31  81 22 124/59 (80) 96 Room Air  


 


6/5/18 16:43     96 Room Air  


 


6/5/18 16:39  80 20 122/58 (79) 95 Room Air  


 


6/5/18 16:29     96 Room Air  


 


6/5/18 16:13 98.4 89 22 111/57 (75) 97   








Result Diagram:  


6/6/18 0830                                                                    

            6/6/18 0830





Imaging





Last Impressions








Chest CT 6/6/18 0000 Signed





Impressions: 





 CONCLUSION:  





 1.  Left upper lobe malignant mass appears larger since the prior study from 1/ 2018.





 2.  Parenchymal consolidation infiltrates are seen bilaterally some of which we





 re not present previously particularly in right lung.





 3.  The right lower lobe nodule which measured 1.7 cm previously is not visuali





 zed and there are a couple of tiny subcentimeter nodules right lower lobe not c





 learly identified previously. Right middle lobe nodule is similar and not kearns





 ed.





 4.  Large left pleural effusion and a tiny right pleural effusion.





 5.  Dense vascular calcifications of coronary is an aorta and high-grade stenos





 is of celiac and SMA arteries are suspected discussed on the patient's prior CT





  angiogram of 2/2018.





  





 


 


Chest X-Ray 6/5/18 1637 Signed





Impressions: 





 CONCLUSION: 





 Persistently elevated left hemidiaphragm with a pleural effusion on the left ar





 e unchanged. Some adjacent consolidation as well





  





 








Objective Remarks


GENERAL: No distress.


SKIN: No rashes, ecchymoses or lesions. Cool and dry.


HEAD: Atraumatic. Normocephalic. No temporal or scalp tenderness.


EYES: Pupils equal round and reactive. Extraocular motions intact. No scleral 

icterus. No injection or drainage. 


ENT: Nose without bleeding, purulent drainage or septal hematoma. Throat 

without erythema, tonsillar hypertrophy or exudate. Uvula midline. Airway 

patent.


NECK: Trachea midline. No JVD or lymphadenopathy. Supple, nontender, no 

meningeal signs.


CARDIOVASCULAR: Regular rate and rhythm without murmurs, gallops, or rubs. 


RESPIRATORY: Decreased breath sounds left lung base.  No wheezes.


GASTROINTESTINAL: Abdomen soft, non-tender, nondistended. No hepato-splenomegaly

, or palpable masses. No guarding.


MUSCULOSKELETAL: Extremities without clubbing, cyanosis, or edema. No joint 

tenderness, effusion, or edema noted. 


NEUROLOGICAL: Awake and alert. Cranial nerves II through XII intact.  Motor and 

sensory grossly within normal limits. Normal speech.





A/P


Assessment and Plan


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


Chest x-ray significant for pleural effusion on the left that appears 

unchanged. Cardiothoracic surgery consult appreciated. CT showed: Left upper 

lobe malignant mass appears larger since the prior study from 1/2018; 

Parenchymal consolidation infiltrates are seen bilaterally some of which were 

not present previously particularly in right lung; The right lower lobe nodule 

which measured 1.7 cm previously is not visualized and there are a couple of 

tiny subcentimeter nodules right lower lobe not clearly identified previously; 

Large left pleural effusion and a tiny right pleural effusion.


- Medical oncology consulted.


- VATS  and Pleurx catheter on Friday per CTS.


- oxygen and nebs as needed.





Atrial fibrillation


Patient not on anticoagulation secondary to history of GI bleed.


- Continue home medications.





Seizure disorder


No evidence of seizures.


- Continue home Dilantin.





Hypertension/CAD/hypothyroidism/peripheral vascular disease


Stable. 


- Holding home aspirin for possible procedure.


- Continue home medications.





Anemia


Likely s/t carcinoma. 


- follow CBC.





PPx: Holding pharmacologic anticoagulation for possible procedure











Fabián Silverio DO Jun 6, 2018 13:49 normal...

## 2023-06-04 NOTE — ED ADULT TRIAGE NOTE - CHIEF COMPLAINT QUOTE
Pt presents to ER c/o left leg, hip, arm, neck and foot pain s/p fall from horse while on vacation 2 days. Pt denies any LOC.

## 2023-06-04 NOTE — ED PROVIDER NOTE - PATIENT PORTAL LINK FT
You can access the FollowMyHealth Patient Portal offered by [facility] by registering at the following website: [email or web address]. By joining [facility]’s FollowMyHealth portal, you will also be able to view your health information using other applications (apps) compatible with our system.

## 2023-06-04 NOTE — ED ADULT NURSE NOTE - NSFALLUNIVINTERV_ED_ALL_ED
Bed/Stretcher in lowest position, wheels locked, appropriate side rails in place/Call bell, personal items and telephone in reach/Instruct patient to call for assistance before getting out of bed/chair/stretcher/Non-slip footwear applied when patient is off stretcher/[place] to call system/Physically safe environment - no spills, clutter or unnecessary equipment/Purposeful proactive rounding/Room/bathroom lighting operational, light cord in reach

## 2023-06-04 NOTE — ED PROVIDER NOTE - MUSCULOSKELETAL, MLM
Spine appears normal w no central tenderness and supple neck , extensive contusions, L hip / L buttock / L back of thigh, tender L ankle / swelling, rt  mid metatarsals , pulses sensation / motor intact, gait intact , no pain w ROM hips / knees

## 2023-06-04 NOTE — ED PROVIDER NOTE - CLINICAL SUMMARY MEDICAL DECISION MAKING FREE TEXT BOX
+ fall off horse, sustained multiple contusions, + mild HA , likely mild post concussive symptoms, abd soft NT / no CVA tender, doubt internal injury / no CP no SOB/,   doubt internal injury   ortho f/u for foot / ankle sprains / contusions.

## 2023-06-04 NOTE — ED PROVIDER NOTE - OBJECTIVE STATEMENT
34 yo , 2 days ago fall off horse while on vacation, pt reports fell on L side,, pain at L hip/ L buttock/ back of left leg, L ankle, right foot, soreness to L chest wall, no neck pain, no central spine pain, feels stiff all over, no CP no abd pain, no n/v/d, mild H/A dizzy now , hit L side of head w no LOC intial HA resolving, dizzy intially resolved, no amnesia

## 2023-06-05 PROBLEM — J45.909 UNSPECIFIED ASTHMA, UNCOMPLICATED: Chronic | Status: ACTIVE | Noted: 2022-10-26

## 2023-10-15 ENCOUNTER — EMERGENCY (EMERGENCY)
Facility: HOSPITAL | Age: 33
LOS: 1 days | Discharge: HOME | End: 2023-10-15
Admitting: EMERGENCY MEDICINE
Payer: COMMERCIAL

## 2023-10-15 VITALS
DIASTOLIC BLOOD PRESSURE: 68 MMHG | TEMPERATURE: 98 F | HEART RATE: 81 BPM | SYSTOLIC BLOOD PRESSURE: 108 MMHG | OXYGEN SATURATION: 100 % | RESPIRATION RATE: 18 BRPM

## 2023-10-15 LAB
ANION GAP SERPL CALC-SCNC: 10 MMOL/L — SIGNIFICANT CHANGE UP (ref 5–17)
BASOPHILS # BLD AUTO: 0.03 K/UL — SIGNIFICANT CHANGE UP (ref 0–0.2)
BASOPHILS NFR BLD AUTO: 0.2 % — SIGNIFICANT CHANGE UP (ref 0–2)
BUN SERPL-MCNC: 6 MG/DL — LOW (ref 7–23)
CALCIUM SERPL-MCNC: 9.5 MG/DL — SIGNIFICANT CHANGE UP (ref 8.4–10.5)
CHLORIDE SERPL-SCNC: 102 MMOL/L — SIGNIFICANT CHANGE UP (ref 96–108)
CO2 SERPL-SCNC: 24 MMOL/L — SIGNIFICANT CHANGE UP (ref 22–31)
CREAT SERPL-MCNC: 0.66 MG/DL — SIGNIFICANT CHANGE UP (ref 0.5–1.3)
EGFR: 119 ML/MIN/1.73M2 — SIGNIFICANT CHANGE UP
EOSINOPHIL # BLD AUTO: 0.02 K/UL — SIGNIFICANT CHANGE UP (ref 0–0.5)
EOSINOPHIL NFR BLD AUTO: 0.1 % — SIGNIFICANT CHANGE UP (ref 0–6)
GLUCOSE SERPL-MCNC: 97 MG/DL — SIGNIFICANT CHANGE UP (ref 70–99)
HCG SERPL-ACNC: <0 MIU/ML — SIGNIFICANT CHANGE UP
HCT VFR BLD CALC: 44.7 % — SIGNIFICANT CHANGE UP (ref 34.5–45)
HGB BLD-MCNC: 14.2 G/DL — SIGNIFICANT CHANGE UP (ref 11.5–15.5)
IMM GRANULOCYTES NFR BLD AUTO: 0.4 % — SIGNIFICANT CHANGE UP (ref 0–0.9)
LYMPHOCYTES # BLD AUTO: 15.7 % — SIGNIFICANT CHANGE UP (ref 13–44)
LYMPHOCYTES # BLD AUTO: 2.22 K/UL — SIGNIFICANT CHANGE UP (ref 1–3.3)
MCHC RBC-ENTMCNC: 27.8 PG — SIGNIFICANT CHANGE UP (ref 27–34)
MCHC RBC-ENTMCNC: 31.8 GM/DL — LOW (ref 32–36)
MCV RBC AUTO: 87.5 FL — SIGNIFICANT CHANGE UP (ref 80–100)
MONOCYTES # BLD AUTO: 1.04 K/UL — HIGH (ref 0–0.9)
MONOCYTES NFR BLD AUTO: 7.3 % — SIGNIFICANT CHANGE UP (ref 2–14)
NEUTROPHILS # BLD AUTO: 10.79 K/UL — HIGH (ref 1.8–7.4)
NEUTROPHILS NFR BLD AUTO: 76.3 % — SIGNIFICANT CHANGE UP (ref 43–77)
NRBC # BLD: 0 /100 WBCS — SIGNIFICANT CHANGE UP (ref 0–0)
PLATELET # BLD AUTO: 253 K/UL — SIGNIFICANT CHANGE UP (ref 150–400)
POTASSIUM SERPL-MCNC: 4.2 MMOL/L — SIGNIFICANT CHANGE UP (ref 3.5–5.3)
POTASSIUM SERPL-SCNC: 4.2 MMOL/L — SIGNIFICANT CHANGE UP (ref 3.5–5.3)
RBC # BLD: 5.11 M/UL — SIGNIFICANT CHANGE UP (ref 3.8–5.2)
RBC # FLD: 13.7 % — SIGNIFICANT CHANGE UP (ref 10.3–14.5)
SODIUM SERPL-SCNC: 136 MMOL/L — SIGNIFICANT CHANGE UP (ref 135–145)
WBC # BLD: 14.16 K/UL — HIGH (ref 3.8–10.5)
WBC # FLD AUTO: 14.16 K/UL — HIGH (ref 3.8–10.5)

## 2023-10-15 PROCEDURE — 96375 TX/PRO/DX INJ NEW DRUG ADDON: CPT | Mod: XU

## 2023-10-15 PROCEDURE — 85025 COMPLETE CBC W/AUTO DIFF WBC: CPT

## 2023-10-15 PROCEDURE — 80048 BASIC METABOLIC PNL TOTAL CA: CPT

## 2023-10-15 PROCEDURE — 36415 COLL VENOUS BLD VENIPUNCTURE: CPT

## 2023-10-15 PROCEDURE — 87184 SC STD DISK METHOD PER PLATE: CPT

## 2023-10-15 PROCEDURE — 84702 CHORIONIC GONADOTROPIN TEST: CPT

## 2023-10-15 PROCEDURE — 87181 SC STD AGAR DILUTION PER AGT: CPT

## 2023-10-15 PROCEDURE — 10060 I&D ABSCESS SIMPLE/SINGLE: CPT

## 2023-10-15 PROCEDURE — 87205 SMEAR GRAM STAIN: CPT

## 2023-10-15 PROCEDURE — 76642 ULTRASOUND BREAST LIMITED: CPT

## 2023-10-15 PROCEDURE — 76642 ULTRASOUND BREAST LIMITED: CPT | Mod: 26,LT

## 2023-10-15 PROCEDURE — 99285 EMERGENCY DEPT VISIT HI MDM: CPT

## 2023-10-15 PROCEDURE — 87070 CULTURE OTHR SPECIMN AEROBIC: CPT

## 2023-10-15 PROCEDURE — 96374 THER/PROPH/DIAG INJ IV PUSH: CPT | Mod: XU

## 2023-10-15 PROCEDURE — 99284 EMERGENCY DEPT VISIT MOD MDM: CPT | Mod: 25

## 2023-10-15 RX ORDER — KETOROLAC TROMETHAMINE 30 MG/ML
15 SYRINGE (ML) INJECTION ONCE
Refills: 0 | Status: DISCONTINUED | OUTPATIENT
Start: 2023-10-15 | End: 2023-10-15

## 2023-10-15 RX ADMIN — Medication 100 MILLIGRAM(S): at 18:58

## 2023-10-15 RX ADMIN — Medication 15 MILLIGRAM(S): at 17:25

## 2023-10-15 NOTE — ED ADULT NURSE NOTE - NS ED NURSE LEVEL OF CONSCIOUSNESS AFFECT
I have reached out to this patient, intermittent left arm numbness and tingling. Recent cardiac work up all negative per patient form recent ER visit. Some neck stiffness as well , taking tylenol and motrin , prn. Please advise.    Calm/Appropriate

## 2023-10-15 NOTE — CONSULT NOTE ADULT - SUBJECTIVE AND OBJECTIVE BOX
SURGERY CONSULT  ==============================================================================================================  HPI:      PAST MEDICAL & SURGICAL HISTORY:  Asthma      No significant past surgical history        Home Meds: Albuterol PRN    Allergies:     No Known Allergies    I      Soc:   Advanced Directives: Presumed Full Code       VITAL SIGNS, INS/OUTS (last 24 hours):  --------------------------------------------------------------------------------------  ICU Vital Signs Last 24 Hrs  T(C): 36.9 (15 Oct 2023 16:11), Max: 36.9 (15 Oct 2023 16:11)  T(F): 98.5 (15 Oct 2023 16:11), Max: 98.5 (15 Oct 2023 16:11)  HR: 81 (15 Oct 2023 16:11) (81 - 81)  BP: 108/68 (15 Oct 2023 16:11) (108/68 - 108/68)  RR: 18 (15 Oct 2023 16:11) (18 - 18)  SpO2: 100% (15 Oct 2023 16:11) (100% - 100%)    O2 Parameters below as of 15 Oct 2023 16:11  Patient On (Oxygen Delivery Method): room air          I&O's Summary    --------------------------------------------------------------------------------------    EXAM:  General: Resting in bed, NAD  Neuro: A&Ox3 no focal deficits  CV: NSR  Pulm: Equal chest wall expansion b/l, no respiratory distress  Breast: L breast @ 8 o'clock position 6 cm from nipple with flucutuance and erythema with mild puruelnt drainage at inferior aspect  Abdomen: Soft, ND, NT  Extremities: WWP      LABS  --------------------------------------------------------------------------------------  Labs:  CAPILLARY BLOOD GLUCOSE                              14.2   14.16 )-----------( 253      ( 15 Oct 2023 17:07 )             44.7       Auto Neutrophil %: 76.3 % (10-15-23 @ 17:07)  Auto Immature Granulocyte %: 0.4 % (10-15-23 @ 17:07)    10-15    136  |  102  |  6<L>  ----------------------------<  97  4.2   |  24  |  0.66      Calcium: 9.5 mg/dL (10-15-23 @ 17:07)    Urinalysis Basic - ( 15 Oct 2023 17:07 )    Color: x / Appearance: x / SG: x / pH: x  Gluc: 97 mg/dL / Ketone: x  / Bili: x / Urobili: x   Blood: x / Protein: x / Nitrite: x   Leuk Esterase: x / RBC: x / WBC x   Sq Epi: x / Non Sq Epi: x / Bacteria: x          IMAGING RESULTS    INTERPRETATION:  CLINICAL INDICATION: Left breast pain. Rule out abscess.    TECHNIQUE:  Limited sonographic evaluation of the left breast was   performed, targeted to the area of pain in the lower outer quadrant.   ?Evaluation was performed by ultrasound technician.  This study was   performed exclusively for the purpose of excluding the presence of   abscess in the clinical setting of mastitis.?  ?  FINDINGS: Within the left lower outer quadrant, at the 8:00 position,   there is an avascular 4.2 x 1.1 x 2.4 cm complex fluid collection which   may represent abscess versus hematoma.  ?  IMPRESSION:  4.2 cm complex fluid collection likely representing abscess versus   hematoma.    If symptoms do not resolve clinically, additional imaging in a dedicated   breast imaging center should be performed to exclude the possibility of   malignancy.       SURGERY CONSULT  ==============================================================================================================  HPI:  Patient is a 33F with PMHx of asthma and PSHx of numerous I&Ds (breast x2, groin x2) who presents to [facility] for worsening L breast pain. Patient reports pain started 2 days ago with small area of erythema that progressively increase with worsening pain since then. Patient describes the pain and sharp and heavy and worse with pressure. Reports pain is minimally relieved by tylenol and only relieved by I&D. States she has had this twice in past, once in L breast and once in R breast. Denies fevers or chills at home. Denies any other systemic complaints. States pain was not improving, so she came to the ED for evaluation.    Denies family or personal hx of malignancy of breast.    Medical History: Asthma  Surgical History: I&D x4  Medications: Albuterol PRN  Allergies: NKDA  Social History: States she is a current smoker. Smokes 1 pack of cigarettes weekly for 5 years. Reports she smokes marajuana daily. Admits to social alcohol use. Denies any additional drug use. She works at a warehouse.    In the ED, patien afebrile:  - VITALS: Afebrile T 98.5, HR 81, /68, saturating well on RA  - LABORATORY: WBC 14K with mild neutrophil predominence, Hb 14.2, metabolic panel unremakable with Cr 0.66  - IMAGING: 4 x 1 x 2 cm complex fluid collection at 8 o'clock psotion on LIQ c/f abscess vs. hematoma      PAST MEDICAL & SURGICAL HISTORY:  Asthma      No significant past surgical history        Home Meds: Albuterol PRN    Allergies:     No Known Allergies    I      Soc:   Advanced Directives: Presumed Full Code       VITAL SIGNS, INS/OUTS (last 24 hours):  --------------------------------------------------------------------------------------  ICU Vital Signs Last 24 Hrs  T(C): 36.9 (15 Oct 2023 16:11), Max: 36.9 (15 Oct 2023 16:11)  T(F): 98.5 (15 Oct 2023 16:11), Max: 98.5 (15 Oct 2023 16:11)  HR: 81 (15 Oct 2023 16:11) (81 - 81)  BP: 108/68 (15 Oct 2023 16:11) (108/68 - 108/68)  RR: 18 (15 Oct 2023 16:11) (18 - 18)  SpO2: 100% (15 Oct 2023 16:11) (100% - 100%)    O2 Parameters below as of 15 Oct 2023 16:11  Patient On (Oxygen Delivery Method): room air          I&O's Summary    --------------------------------------------------------------------------------------    EXAM:  General: Resting in bed, NAD  Neuro: A&Ox3 no focal deficits  CV: NSR  Pulm: Equal chest wall expansion b/l, no respiratory distress  Breast: L breast @ 8 o'clock position 6 cm from nipple with flucutuance and erythema with mild puruelnt drainage at inferior aspect  Abdomen: Soft, ND, NT  Extremities: WWP      LABS  --------------------------------------------------------------------------------------  Labs:  CAPILLARY BLOOD GLUCOSE                              14.2   14.16 )-----------( 253      ( 15 Oct 2023 17:07 )             44.7       Auto Neutrophil %: 76.3 % (10-15-23 @ 17:07)  Auto Immature Granulocyte %: 0.4 % (10-15-23 @ 17:07)    10-15    136  |  102  |  6<L>  ----------------------------<  97  4.2   |  24  |  0.66      Calcium: 9.5 mg/dL (10-15-23 @ 17:07)    Urinalysis Basic - ( 15 Oct 2023 17:07 )    Color: x / Appearance: x / SG: x / pH: x  Gluc: 97 mg/dL / Ketone: x  / Bili: x / Urobili: x   Blood: x / Protein: x / Nitrite: x   Leuk Esterase: x / RBC: x / WBC x   Sq Epi: x / Non Sq Epi: x / Bacteria: x          IMAGING RESULTS    INTERPRETATION:  CLINICAL INDICATION: Left breast pain. Rule out abscess.    TECHNIQUE:  Limited sonographic evaluation of the left breast was   performed, targeted to the area of pain in the lower outer quadrant.   ?Evaluation was performed by ultrasound technician.  This study was   performed exclusively for the purpose of excluding the presence of   abscess in the clinical setting of mastitis.?  ?  FINDINGS: Within the left lower outer quadrant, at the 8:00 position,   there is an avascular 4.2 x 1.1 x 2.4 cm complex fluid collection which   may represent abscess versus hematoma.  ?  IMPRESSION:  4.2 cm complex fluid collection likely representing abscess versus   hematoma.    If symptoms do not resolve clinically, additional imaging in a dedicated   breast imaging center should be performed to exclude the possibility of   malignancy.

## 2023-10-15 NOTE — ED ADULT TRIAGE NOTE - CHIEF COMPLAINT QUOTE
Presents for multiple medical complaints- C/o redness and rash to R side face and stacey-oral area x 7 days with no known new exposures, denies sob/itching. C/o L hand pain chronic from work, now worse. C/o pain to L breast as site of suspected abscess, hx of frequent breast abscesses. Denies CP/SOB/weakness/dizziness/tingling/cough/fevers/known sick contacts.

## 2023-10-15 NOTE — ED PROVIDER NOTE - NS ED MD DISPO DISCHARGE
Mother states that patient had a reaction to nexium, 40 minutes after giving patient he broke out in hives on his back, mother administered benadryl and patient did well, no SOB or dyspnea, patient was seen at allergist and has several food allergies, mother would like to discuss this with Dr. Cecelia Peck and what next step in care will be, please advise. Home

## 2023-10-15 NOTE — CONSULT NOTE ADULT - ASSESSMENT
ASSESSMENT/ PLAN:  33F with PMHx of asthma and PSHx of numerous I&Ds (breast x2, groin x2) who presents to [facility] for worsening L breast pain. Patient afebrile and HD stable on presentation. Presentation, laboratory, exam findings concerning for breast abscess. I&D'ed at bedside.    Bedside I&D performed - 15cc purulent fluid expressed. Cultures sent. F/U Cultures  Recommend discharge with Bactrim  TID for 4 days  Patient should do warm compresses daily for pain with hot showers with packing in place.  Patient counseled on smoking cessation  Patient can follow up with Dr. Maria as outpatient. Patient should see Dr. Maria tomorrow in office at [address] [place] [address] between 9 AM and 12 noon.  Attending aware and agrees with plan

## 2023-10-15 NOTE — ED PROVIDER NOTE - CLINICAL SUMMARY MEDICAL DECISION MAKING FREE TEXT BOX
32 yo f with pmh of multiple breast abscesses, asthma c/o breast abscess to L breast x 2 days. Associated with pain. States she has had multiple abscesses drained in the past. Reports putting vicks on the area. Also c/o redness around her mouth x 1 week. No itching. Has not used new products. States applying coconut oil for relief. Lastly, c/o L wrist pain x 1 week. States recently started a new job and has been lifting a lot but denies a specific injury. Denies fever, chills, cp, sob, lip/tongue swelling. VSS. will check labs, L breast sono.   wrist pain - will treat with antiinflammtories  perioral rash- ?dry skin vs eczema- advised moisturizing, avoiding new products, derm f/u

## 2023-10-15 NOTE — ED PROVIDER NOTE - PHYSICAL EXAMINATION
CONSTITUTIONAL: Well-appearing;  in no apparent distress.   HEAD: Normocephalic; atraumatic.   EYES: PERRL; EOM intact; conjunctiva and sclera clear  ENT: normal nose; no rhinorrhea; normal pharynx with no erythema or lesions.   NECK: Supple; non-tender; no LAD  CARDIOVASCULAR: Normal S1, S2; No audible murmurs. Regular rate and rhythm.   RESPIRATORY: Breathing easily; breath sounds clear and equal bilaterally; no wheezes, rhonchi, or rales.  GI: Soft; non-distended; non-tender; no palpable organomegaly.   MSK: FROM at all extremities, normal tone; L wrist -no swelling, nontender, FROM, pulses intact   EXT: No cyanosis or edema; N/V intact  SKIN: L breast- +induration/abscess to inner lower quadrant of breast with erythema and tenderness. +mild perioral erythema with dry skin

## 2023-10-15 NOTE — ED PROVIDER NOTE - NSFOLLOWUPINSTRUCTIONS_ED_ALL_ED_FT
Follow up with Dr. Jihan Maria tomorrow   Take antibiotics as prescribed  Take motrin as needed for pain  Apply warm compresses to the area     Abscess    An abscess is an infected area that contains a collection of pus and debris. It can occur in almost any part of the body and occurs when the tissue gets infection. Symptoms include a painful mass that is red, warm, tender that might break open and HAVE drainage. If your health care provider gave you antibiotics make sure to take the full course and do not stop even if feeling better.     SEEK IMMEDIATE MEDICAL CARE IF YOU HAVE ANY OF THE FOLLOWING SYMPTOMS: chills, fever, muscle aches, or red streaking from the area.

## 2023-10-15 NOTE — ED PROVIDER NOTE - CARE PLAN
Principal Discharge DX:	Breast abscess  Secondary Diagnosis:	Pain, wrist, left  Secondary Diagnosis:	Rash   1

## 2023-10-15 NOTE — ED PROVIDER NOTE - OBJECTIVE STATEMENT
32 yo f with pmh of multiple breast abscesses, asthma c/o breast abscess to L breast x 2 days. Associated with pain. States she has had multiple abscesses drained in the past. Reports putting vicks on the area. Also c/o redness around her mouth x 1 week. No itching. Has not used new products. States applying coconut oil for relief. Lastly, c/o L wrist pain x 1 week.  Pt RHD. States recently started a new job and has been lifting a lot but denies a specific injury. Denies fever, chills, cp, sob, lip/tongue swelling.

## 2023-10-15 NOTE — ED PROVIDER NOTE - PATIENT PORTAL LINK FT
You can access the FollowMyHealth Patient Portal offered by [facility] by registering at the following website: [email or web address]. By joining [facility]’s FollowMyHealth portal, you will also be able to view your health information using other applications (apps) compatible with our system.

## 2023-10-15 NOTE — ED PROVIDER NOTE - CARE PROVIDER_API CALL
Jihan Maria [facility]  Surgery  [address]  [place] [address]  Phone: [phone]  Fax: [phone]  Follow Up Time:

## 2023-10-15 NOTE — ED ADULT NURSE NOTE - OBJECTIVE STATEMENT
patient c/o redness and rash to R side face and stacey-oral area x 7 days with no known new exposures, denies sob/itching. C/o L hand pain chronic from work, now worse. C/o pain to L breast as site of suspected abscess, hx of frequent breast abscesses.

## 2023-10-15 NOTE — ED PROVIDER NOTE - PROGRESS NOTE DETAILS
+R breast abscess drained by surgery. Surg advising bactrim 1 tab TID x 4 days. will f/u with Dr. Maria tomorrow

## 2023-10-16 ENCOUNTER — NON-APPOINTMENT (OUTPATIENT)
Age: 33
End: 2023-10-16

## 2023-10-16 LAB
GRAM STN FLD: SIGNIFICANT CHANGE UP
SPECIMEN SOURCE: SIGNIFICANT CHANGE UP

## 2023-10-17 ENCOUNTER — NON-APPOINTMENT (OUTPATIENT)
Age: 33
End: 2023-10-17

## 2023-10-17 ENCOUNTER — APPOINTMENT (OUTPATIENT)
Dept: BREAST CENTER | Facility: CLINIC | Age: 33
End: 2023-10-17
Payer: MEDICAID

## 2023-10-17 VITALS
BODY MASS INDEX: 24.98 KG/M2 | HEART RATE: 67 BPM | DIASTOLIC BLOOD PRESSURE: 71 MMHG | HEIGHT: 63 IN | SYSTOLIC BLOOD PRESSURE: 102 MMHG | WEIGHT: 141 LBS

## 2023-10-17 DIAGNOSIS — J45.909 UNSPECIFIED ASTHMA, UNCOMPLICATED: ICD-10-CM

## 2023-10-17 DIAGNOSIS — M25.532 PAIN IN LEFT WRIST: ICD-10-CM

## 2023-10-17 DIAGNOSIS — F17.210 NICOTINE DEPENDENCE, CIGARETTES, UNCOMPLICATED: ICD-10-CM

## 2023-10-17 DIAGNOSIS — S21.002A UNSPECIFIED OPEN WOUND OF LEFT BREAST, INITIAL ENCOUNTER: ICD-10-CM

## 2023-10-17 DIAGNOSIS — Z78.9 OTHER SPECIFIED HEALTH STATUS: ICD-10-CM

## 2023-10-17 DIAGNOSIS — N61.1 ABSCESS OF THE BREAST AND NIPPLE: ICD-10-CM

## 2023-10-17 DIAGNOSIS — F17.200 NICOTINE DEPENDENCE, UNSPECIFIED, UNCOMPLICATED: ICD-10-CM

## 2023-10-17 DIAGNOSIS — R21 RASH AND OTHER NONSPECIFIC SKIN ERUPTION: ICD-10-CM

## 2023-10-17 DIAGNOSIS — Z87.2 PERSONAL HISTORY OF DISEASES OF THE SKIN AND SUBCUTANEOUS TISSUE: ICD-10-CM

## 2023-10-17 DIAGNOSIS — Z87.09 PERSONAL HISTORY OF OTHER DISEASES OF THE RESPIRATORY SYSTEM: ICD-10-CM

## 2023-10-17 PROCEDURE — 99203 OFFICE O/P NEW LOW 30 MIN: CPT

## 2023-10-17 RX ORDER — SULFAMETHOXAZOLE AND TRIMETHOPRIM 800; 160 MG/1; MG/1
800-160 TABLET ORAL
Refills: 0 | Status: ACTIVE | COMMUNITY

## 2023-10-18 LAB
-  CEFTRIAXONE: SIGNIFICANT CHANGE UP
-  CEFTRIAXONE: SIGNIFICANT CHANGE UP
-  PENICILLIN: SIGNIFICANT CHANGE UP
-  PENICILLIN: SIGNIFICANT CHANGE UP
METHOD TYPE: SIGNIFICANT CHANGE UP

## 2023-10-19 ENCOUNTER — APPOINTMENT (OUTPATIENT)
Dept: BREAST CENTER | Facility: CLINIC | Age: 33
End: 2023-10-19
Payer: MEDICAID

## 2023-10-19 VITALS — SYSTOLIC BLOOD PRESSURE: 102 MMHG | DIASTOLIC BLOOD PRESSURE: 67 MMHG | HEART RATE: 66 BPM | HEIGHT: 63 IN

## 2023-10-19 LAB
CULTURE RESULTS: SIGNIFICANT CHANGE UP
CULTURE RESULTS: SIGNIFICANT CHANGE UP
ORGANISM # SPEC MICROSCOPIC CNT: SIGNIFICANT CHANGE UP
SPECIMEN SOURCE: SIGNIFICANT CHANGE UP
SPECIMEN SOURCE: SIGNIFICANT CHANGE UP

## 2023-10-19 PROCEDURE — 99213 OFFICE O/P EST LOW 20 MIN: CPT

## 2023-10-19 RX ORDER — SULFAMETHOXAZOLE AND TRIMETHOPRIM 800; 160 MG/1; MG/1
800-160 TABLET ORAL TWICE DAILY
Qty: 14 | Refills: 0 | Status: ACTIVE | COMMUNITY
Start: 2023-10-19 | End: 1900-01-01

## 2023-10-23 ENCOUNTER — APPOINTMENT (OUTPATIENT)
Dept: BREAST CENTER | Facility: CLINIC | Age: 33
End: 2023-10-23
Payer: MEDICAID

## 2023-10-23 VITALS
BODY MASS INDEX: 25.16 KG/M2 | DIASTOLIC BLOOD PRESSURE: 77 MMHG | SYSTOLIC BLOOD PRESSURE: 107 MMHG | HEIGHT: 63 IN | WEIGHT: 142 LBS | HEART RATE: 66 BPM

## 2023-10-23 PROCEDURE — 99213 OFFICE O/P EST LOW 20 MIN: CPT

## 2023-10-26 ENCOUNTER — APPOINTMENT (OUTPATIENT)
Dept: BREAST CENTER | Facility: CLINIC | Age: 33
End: 2023-10-26
Payer: MEDICAID

## 2023-10-26 VITALS
SYSTOLIC BLOOD PRESSURE: 93 MMHG | HEART RATE: 55 BPM | WEIGHT: 141 LBS | DIASTOLIC BLOOD PRESSURE: 61 MMHG | BODY MASS INDEX: 24.98 KG/M2 | HEIGHT: 63 IN

## 2023-10-26 PROCEDURE — 99213 OFFICE O/P EST LOW 20 MIN: CPT

## 2023-10-30 ENCOUNTER — APPOINTMENT (OUTPATIENT)
Dept: BREAST CENTER | Facility: CLINIC | Age: 33
End: 2023-10-30
Payer: MEDICAID

## 2023-10-30 VITALS
HEIGHT: 63 IN | WEIGHT: 143 LBS | BODY MASS INDEX: 25.34 KG/M2 | HEART RATE: 80 BPM | SYSTOLIC BLOOD PRESSURE: 106 MMHG | DIASTOLIC BLOOD PRESSURE: 72 MMHG

## 2023-10-30 PROCEDURE — 99213 OFFICE O/P EST LOW 20 MIN: CPT

## 2023-11-02 ENCOUNTER — APPOINTMENT (OUTPATIENT)
Dept: INTERNAL MEDICINE | Facility: CLINIC | Age: 33
End: 2023-11-02

## 2023-11-02 ENCOUNTER — APPOINTMENT (OUTPATIENT)
Dept: BREAST CENTER | Facility: CLINIC | Age: 33
End: 2023-11-02
Payer: MEDICAID

## 2023-11-02 VITALS
HEART RATE: 71 BPM | WEIGHT: 149 LBS | HEIGHT: 63 IN | BODY MASS INDEX: 26.4 KG/M2 | SYSTOLIC BLOOD PRESSURE: 111 MMHG | DIASTOLIC BLOOD PRESSURE: 77 MMHG

## 2023-11-02 DIAGNOSIS — S21.002D UNSPECIFIED OPEN WOUND OF LEFT BREAST, SUBSEQUENT ENCOUNTER: ICD-10-CM

## 2023-11-02 DIAGNOSIS — N61.1 ABSCESS OF THE BREAST AND NIPPLE: ICD-10-CM

## 2023-11-02 PROCEDURE — 99212 OFFICE O/P EST SF 10 MIN: CPT

## 2023-11-06 ENCOUNTER — APPOINTMENT (OUTPATIENT)
Dept: BREAST CENTER | Facility: CLINIC | Age: 33
End: 2023-11-06

## 2024-06-17 NOTE — ED PROVIDER NOTE - TEMPLATE, MLM
of the finding on bone scan:   Small heterogeneous a sclerotic lesion in the right occipital   calvaria at the site of the increased uptake on the bone scan most likely a   benign hemangioma. No lesions present to strongly suspect prostate metastatic   disease.   PSA 4.6 on 5-18-22:   He has ablation for atrial fib, on Xarelto.   PSA 6.5 in 11-23.   PSA 6.7 May 24; free 33.5%  Has hemorrhoids. No voiding issues.         Past Medical History:   Diagnosis Date    BPH associated with nocturia 10/10/2011    Calculus of kidney     Chronic otitis media     Chronic renal disease, stage III (HCC) [569641] 06/16/2022    Conductive hearing loss     Current use of long term anticoagulation     Xarelto    Deviated nasal septum     Dysfunction of both eustachian tubes     Elevated PSA 5/22/2017    External hemorrhoids     GERD (gastroesophageal reflux disease)     Hearing loss     History of prior ablation treatment     s/p afib ablation    Hyperlipidemia     Hypertension     Mixed hearing loss, bilateral     Obesity     BMI 33    PAF (paroxysmal atrial fibrillation) (HCC)     Perforation of left tympanic membrane     Right chronic serous otitis media     Seasonal allergies     SNHL (sensorineural hearing loss)      Past Surgical History:   Procedure Laterality Date    ANKLE FRACTURE SURGERY Left 7/31/2023    LEFT ANKLE OPEN REDUCTION INTERNAL FIXATION performed by Maxime Terry MD at [facility] OPC    COLONOSCOPY N/A 11/18/2021    COLONOSCOPY/ 32 performed by Nishant Zambrano MD at [facility] ENDOSCOPY    COLONOSCOPY  05/21/2014    Hyperplastic Polyps, diverticulosis - Repeat in 7 years    COLONOSCOPY-HIGH RISK   11/24/2021         MYRINGOTOMY Bilateral 01/25/2016    with U-tube placement    OTHER SURGICAL HISTORY      s/p afib ablation    TONSILLECTOMY      at age 5     Current Outpatient Medications   Medication Sig Dispense Refill    atorvastatin (LIPITOR) 10 MG tablet Take 1 tablet by mouth daily 90 tablet 2    carvedilol (COREG) 25 MG 
General

## 2025-05-04 NOTE — ED PROVIDER NOTE - MDM ORDERS SUBMITTED SELECTION
"Reason for Disposition   Condition / symptoms SAME (not improving)    Additional Information   Negative: Sounds like a life-threatening emergency to the triager   Negative: Discharged in past month from the hospital with a diagnosis of chronic obstructive pulmonic disease (COPD)   Negative: Discharged in past month from the hospital with a diagnosis of congestive heart failure (CHF) or heart failure (HF)   Negative: Discharged in past month from the hospital with a diagnosis of heart attack (myocardial infarction)   Negative: Discharged in past month from the hospital with a diagnosis of pneumonia   Negative: Taking antibiotic for cellulitis (follow-up call)   Negative: Taking antibiotic for other infection (follow-up call)   Negative: [1] Post-op AND [2] incision symptoms or questions   Negative: [1] Post-op AND [2] general symptoms or questions   Negative: Pain, redness, swelling, or pus at IV Site   Negative: IV not running or running slowly   Negative: Symptoms arising from use of a urinary catheter (e.g., Coude, Stone)   Negative: Medication question   Negative: New-onset fever   Negative: [1] New symptom AND [2] not a possible complication of hospitalized condition   Negative: Patient sounds very sick or weak to the triager   Negative: Sounds like a serious complication to the triager   Negative: Condition / symptoms WORSE   Negative: [1] Caller has URGENT question AND [2] triager unable to answer question   Negative: [1] Discharged from hospital within this past week AND [2] taking Coumadin (warfarin) AND [3] no INR testing performed within 5 days of discharge    Answer Assessment - Initial Assessment Questions  1. MAIN CONCERN OR SYMPTOM:  \"What is your main concern right now?\" \"What question do you have?\" \"What's the main symptom you're worried about?\" (e.g., breathing difficulty, ankle swelling, weight gain.)      Nausea  2. ONSET: \"When did the  nausea  start?\"      Admitted for nause  3. " "BETTER-SAME-WORSE: \"Are you getting better, staying the same, or getting worse compared to the day you were discharged?\"      same  4. HOSPITALIZATION: \"How long were you hospitalized?\" (e.g., days)      04/28/25 - 05/01/285  5. DISCHARGE DIAGNOSIS:  \"What problem or disease were you hospitalized for?\"      Ulcer  6. DISCHARGE DATE: \"What date were you discharged from the hospital?\"      05/01/25  7. DISCHARGE DOCTOR: \"Who is the main doctor taking care of you now?\"      Hospitalist  8. DISCHARGE APPOINTMENT: \"Have you scheduled a follow-up discharge appointment with your doctor?\"      GI provider in 2 months  9. DISCHARGE MEDICINES: \"Did the doctor (or NP/PA) who discharged you order any new medicines for you to use? If yes, have you filled the prescription and started taking the medicine?\"       Protonix and Carafate  10. PAIN: \"Is there any pain?\" If Yes, ask: \"How bad is it?\"  (Scale 0-10; or mild, moderate, severe)    - NONE (0): no pain    - MILD (1-3): doesn't interfere with normal activities    - MODERATE (4-7): interferes with normal activities (e.g., work or school) or awakens from sleep    - SEVERE (8-10): excruciating pain, unable to do any normal activities        mild  11. FEVER: \"Do you have a fever?\" If Yes, ask: \"What is it, how was it measured  and when did it start?\"        denies  12. OTHER SYMPTOMS: \"Do you have any other symptoms?\"        Lower back pain and no BM    Protocols used: Post-Hospitalization Follow-up Call-ADULT-    " Medications Labs/Medications

## 2025-06-08 NOTE — ED PROVIDER NOTE - NS ED MD DISPO DISCHARGE
Problem: Potential for Falls  Goal: Patient will remain free of falls  Description: INTERVENTIONS:  - Educate patient/family on patient safety including physical limitations  - Instruct patient to call for assistance with activity   - Consider consulting OT/PT to assist with strengthening/mobility based on AM PAC & JH-HLM score  - Consult OT/PT to assist with strengthening/mobility   - Keep Call bell within reach  - Keep bed low and locked with side rails adjusted as appropriate  - Keep care items and personal belongings within reach  - Initiate and maintain comfort rounds  - Make Fall Risk Sign visible to staff  - Offer Toileting every  Hours, in advance of need  - Initiate/Maintain \alarm  - Obtain necessary fall risk management equipment  - Apply yellow socks and bracelet for high fall risk patients  - Consider moving patient to room near nurses station  Outcome: Progressing     Problem: Nutrition/Hydration-ADULT  Goal: Nutrient/Hydration intake appropriate for improving, restoring or maintaining nutritional needs  Description: Monitor and assess patient's nutrition/hydration status for malnutrition. Collaborate with interdisciplinary team and initiate plan and interventions as ordered.  Monitor patient's weight and dietary intake as ordered or per policy. Utilize nutrition screening tool and intervene as necessary. Determine patient's food preferences and provide high-protein, high-caloric foods as appropriate.     INTERVENTIONS:  - Monitor oral intake, urinary output, labs, and treatment plans  - Assess nutrition and hydration status and recommend course of action  - Evaluate amount of meals eaten  - Assist patient with eating if necessary   - Allow adequate time for meals  - Recommend/ encourage appropriate diets, oral nutritional supplements, and vitamin/mineral supplements  - Order, calculate, and assess calorie counts as needed  - Recommend, monitor, and adjust tube feedings and TPN/PPN based on assessed  needs  - Assess need for intravenous fluids  - Provide specific nutrition/hydration education as appropriate  - Include patient/family/caregiver in decisions related to nutrition  Outcome: Progressing     Problem: Neurological Deficit  Goal: Neurological status is stable or improving  Description: Interventions:  - Monitor and assess patient's level of consciousness, motor function, sensory function, and level of assistance needed for ADLs.   - Monitor and report changes from baseline. Collaborate with interdisciplinary team to initiate plan and implement interventions as ordered.   - Provide and maintain a safe environment.  - Consider seizure precautions.  - Consider fall precautions.  - Consider aspiration precautions.  - Consider bleeding precautions.  Outcome: Progressing     Problem: Activity Intolerance/Impaired Mobility  Goal: Mobility/activity is maintained at optimum level for patient  Description: Interventions:  - Assess and monitor patient  barriers to mobility and need for assistive/adaptive devices.  - Assess patient's emotional response to limitations.  - Collaborate with interdisciplinary team and initiate plans and interventions as ordered.  - Encourage independent activity per ability.  - Maintain proper body alignment.  - Perform active/passive rom as tolerated/ordered.  - Plan activities to conserve energy.  - Turn patient as appropriate  Outcome: Progressing     Problem: Communication Impairment  Goal: Ability to express needs and understand communication  Description: Assess patient's communication skills and ability to understand information.  Patient will demonstrate use of effective communication techniques, alternative methods of communication and understanding even if not able to speak.     - Encourage communication and provide alternate methods of communication as needed.  - Collaborate with case management/ for discharge needs.  - Include patient/family/caregiver in  decisions related to communication.  Outcome: Progressing     Problem: Potential for Aspiration  Goal: Non-ventilated patient's risk of aspiration is minimized  Description: Assess and monitor vital signs, respiratory status, and labs (WBC).  Monitor for signs of aspiration (tachypnea, cough, rales, wheezing, cyanosis, fever).    - Assess and monitor patient's ability to swallow.  - Place patient up in chair to eat if possible.  - HOB up at 90 degrees to eat if unable to get patient up into chair.  - Supervise patient during oral intake.   - Instruct patient/ family to take small bites.  - Instruct patient/ family to take small single sips when taking liquids.  - Follow patient-specific strategies generated by speech pathologist.  Outcome: Progressing  Goal: Ventilated patient's risk of aspiration is minimized  Description: Assess and monitor vital signs, respiratory status, airway cuff pressure, and labs (WBC).  Monitor for signs of aspiration (tachypnea, cough, rales, wheezing, cyanosis, fever).    - Elevate head of bed 30 degrees if patient has tube feeding.  - Monitor tube feeding.  Outcome: Progressing     Problem: Nutrition  Goal: Nutrition/Hydration status is improving  Description: Monitor and assess patient's nutrition/hydration status for malnutrition (ex- brittle hair, bruises, dry skin, pale skin and conjunctiva, muscle wasting, smooth red tongue, and disorientation). Collaborate with interdisciplinary team and initiate plan and interventions as ordered.  Monitor patient's weight and dietary intake as ordered or per policy. Utilize nutrition screening tool and intervene per policy. Determine patient's food preferences and provide high-protein, high-caloric foods as appropriate.     - Assist patient with eating.  - Allow adequate time for meals.  - Encourage patient to take dietary supplement as ordered.  - Collaborate with clinical nutritionist.  - Include patient/family/caregiver in decisions related to  nutrition.  Outcome: Progressing      Home

## 2025-07-18 NOTE — ED ADULT NURSE NOTE - NSFALLRSKHARMRISK_ED_ALL_ED
Received pt's call stating she can not make it to her colonoscopy on 8/14/25. Offered to reschedule, patient declined. She states she will call back to reschedule. Colonoscopy canceled.  
no